# Patient Record
Sex: FEMALE | Race: WHITE | Employment: FULL TIME | URBAN - METROPOLITAN AREA
[De-identification: names, ages, dates, MRNs, and addresses within clinical notes are randomized per-mention and may not be internally consistent; named-entity substitution may affect disease eponyms.]

---

## 2017-02-28 ENCOUNTER — DOCUMENTATION ONLY (OUTPATIENT)
Dept: LAB | Facility: CLINIC | Age: 42
End: 2017-02-28
Payer: COMMERCIAL

## 2017-02-28 DIAGNOSIS — E03.9 HYPOTHYROIDISM: Primary | ICD-10-CM

## 2017-02-28 DIAGNOSIS — L70.0 COMMON ACNE: Primary | ICD-10-CM

## 2017-02-28 LAB
POTASSIUM SERPL-SCNC: 4.3 MMOL/L (ref 3.4–5.3)
T4 FREE SERPL-MCNC: 0.76 NG/DL (ref 0.76–1.46)
TSH SERPL DL<=0.05 MIU/L-ACNC: 1.73 MU/L (ref 0.4–4)

## 2017-02-28 PROCEDURE — 84443 ASSAY THYROID STIM HORMONE: CPT | Performed by: INTERNAL MEDICINE

## 2017-02-28 PROCEDURE — 84439 ASSAY OF FREE THYROXINE: CPT | Performed by: INTERNAL MEDICINE

## 2017-02-28 PROCEDURE — 84132 ASSAY OF SERUM POTASSIUM: CPT | Performed by: DERMATOLOGY

## 2017-02-28 PROCEDURE — 36415 COLL VENOUS BLD VENIPUNCTURE: CPT | Performed by: INTERNAL MEDICINE

## 2017-02-28 NOTE — PROGRESS NOTES
Lab orders placed.     Brandy Chakraborty RN  Endocrine Care Coordinator  [facility]

## 2017-03-01 DIAGNOSIS — E03.9 HYPOTHYROIDISM, UNSPECIFIED: ICD-10-CM

## 2017-03-01 RX ORDER — THYROID,PORK 15 MG
TABLET ORAL
Qty: 270 TABLET | Refills: 3 | OUTPATIENT
Start: 2017-03-01

## 2017-03-01 NOTE — TELEPHONE ENCOUNTER
Refill already completed yesterday for [place] Thyroid.    Brandy Chakraborty, RN  Endocrine Care Coordinator  [facility]

## 2017-03-15 ENCOUNTER — OFFICE VISIT (OUTPATIENT)
Dept: ENDOCRINOLOGY | Facility: CLINIC | Age: 42
End: 2017-03-15
Payer: COMMERCIAL

## 2017-03-15 VITALS
BODY MASS INDEX: 22.28 KG/M2 | HEIGHT: 64 IN | HEART RATE: 65 BPM | SYSTOLIC BLOOD PRESSURE: 118 MMHG | DIASTOLIC BLOOD PRESSURE: 72 MMHG | WEIGHT: 130.51 LBS

## 2017-03-15 DIAGNOSIS — E06.3 HASHIMOTO'S THYROIDITIS: ICD-10-CM

## 2017-03-15 DIAGNOSIS — E55.9 VITAMIN D DEFICIENCY: Primary | ICD-10-CM

## 2017-03-15 PROCEDURE — 99214 OFFICE O/P EST MOD 30 MIN: CPT | Performed by: INTERNAL MEDICINE

## 2017-03-15 NOTE — NURSING NOTE
"Polly Hussein's goals for this visit include: Follow up Hypothyroid  She requests these members of her care team be copied on today's visit information: NO    PCP: None    Referring Provider:  No referring provider defined for this encounter.    Chief Complaint   Patient presents with     Thyroid Problem       Initial Ht 1.613 m (5' 3.5\")  Wt 59.2 kg (130 lb 8.2 oz)  BMI 22.76 kg/m2 Estimated body mass index is 22.76 kg/(m^2) as calculated from the following:    Height as of this encounter: 1.613 m (5' 3.5\").    Weight as of this encounter: 59.2 kg (130 lb 8.2 oz).  Medication Reconciliation: complete    Do you need any medication refills at today's visit? NO    "

## 2017-03-15 NOTE — MR AVS SNAPSHOT
After Visit Summary   3/15/2017    Polly Hussein    MRN: [id number]           Patient Information     Date Of Birth          1975        Visit Information        Provider Department      3/15/2017 4:00 PM Gabby Ayala MD [facility]        Today's Diagnoses     Vitamin D deficiency    -  1    Hashimoto's thyroiditis           Follow-ups after your visit        Your next 10 appointments already scheduled     May 23, 2017  6:30 PM CDT   LAB with LAB FIRST FLOOR [facility] ([facility])    [address] [place] [address] [phone]           Patient must bring picture ID.  Patient should be prepared to give a urine specimen  Please do not eat 10-12 hours before your appointment if you are coming in fasting for labs on lipids, cholesterol, or glucose (sugar).  Pregnant women should follow their Care Team instructions. Water with medications is okay. Do not drink coffee or other fluids.   If you have concerns about taking  your medications, please ask at office or if scheduling via MADS, send a message by clicking on Secure Messaging, Message Your Care Team.              Who to contact     If you have questions or need follow up information about today's clinic visit or your schedule please contact [facility] directly at [phone].  Normal or non-critical lab and imaging results will be communicated to you by Apttushart, letter or phone within 4 business days after the clinic has received the results. If you do not hear from us within 7 days, please contact the clinic through Apttushart or phone. If you have a critical or abnormal lab result, we will notify you by phone as soon as possible.  Submit refill requests through MADS or call your pharmacy and they will forward the refill request to us. Please allow 3 business days for your refill to be completed.          Additional Information  "About Your Visit        [employer] Information     flikdate is an electronic gateway that provides easy, online access to your medical records. With flikdate, you can request a clinic appointment, read your test results, renew a prescription or communicate with your care team.     To sign up for flikdate visit the website at [email or web address]   You will be asked to enter the access code listed below, as well as some personal information. Please follow the directions to create your username and password.     Your access code is: [email or web address]  Expires: 2017 11:09 PM     Your access code will  in 90 days. If you need help or a new code, please contact your [facility] or call [phone] for assistance.        Care EveryWhere ID     This is your Care EveryWhere ID. This could be used by other organizations to access your [facility] medical records  [id number]        Your Vitals Were     Pulse Height BMI (Body Mass Index)             65 1.613 m (5' 3.5\") 22.76 kg/m2          Blood Pressure from Last 3 Encounters:   03/15/17 118/72   16 108/67   14 118/64    Weight from Last 3 Encounters:   03/15/17 59.2 kg (130 lb 8.2 oz)   16 60.1 kg (132 lb 8 oz)   14 62.4 kg (137 lb 9 oz)               Primary Care Provider    None       No address on file        Thank you!     Thank you for choosing [facility]  for your care. Our goal is always to provide you with excellent care. Hearing back from our patients is one way we can continue to improve our services. Please take a few minutes to complete the written survey that you may receive in the mail after your visit with us. Thank you!             Your Updated Medication List - Protect others around you: Learn how to safely use, store and throw away your medicines at www.disposemymeds.org.          This list is accurate as of: 3/15/17 11:59 PM.  Always use your most recent med list.             "       Brand Name Dispense Instructions for use    DOMITILA THYROID 15 MG Tabs tablet   Generic drug:  thyroid     270 tablet    TAKE THREE TABLETS BY MOUTH DAILY       cholecalciferol 2000 UNITS tablet     90 tablet    Take 1 tablet by mouth daily       CLARITIN 10 MG tablet   Generic drug:  loratadine      Take 10 mg by mouth daily       clobetasol 0.05 % cream    TEMOVATE     Apply topically 2 times daily       NASONEX 50 MCG/ACT spray   Generic drug:  mometasone      Spray 2 sprays into both nostrils daily       SPIRONOLACTONE PO      Take 25 mg by mouth 2 times daily       triamcinolone 0.1 % cream    KENALOG     Apply topically 2 times daily

## 2017-03-15 NOTE — PROGRESS NOTES
The patient is seen in f/up for evaluation of hypothyroidism.  She was initially seen in our clinic in September 2014.    Polly Hussein is a 42 year old female diagnosed with hypothyroidism after the delivery of her 3rd child, almost 8 years ago. She was initially treated with levothyroxine and Cytomel until 2011, when she established care with doctor Sameer and Cytomel was discontinued.  Average dose of levothyroxine over the last few years has been around 100  g daily. Initially after diagnosis, she saw doctor Preethi.     In July 2014, she was seen by her primary care provider and she was transitioned to [place] [facility] due to persistent fatigue. She continues to take 45 mg daily.     Today, she complains of persistent fatigue.  According to the patient, the fatigue has been present since her 1st delivery, a few years prior to being formally diagnosed with hypothyroidism. Her weight has been fairly stable since last year. The patient reports being compliant with a gluten free diet and a regular exercise schedule. Intermittently, she develops erythematosus and itchy lesions on her forearms.    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   Depression - stopped tx in 2013  Acne  Allergic rhinitis  Dry eyes     Past Surgical History   Procedure Laterality Date     Ent surgery  1997     Tonsillectomy     Gyn surgery  2011     Partial Hysterectomy - prolapsed uterus and bladder sling - ovaries were left in place      Current Medications    Current Outpatient Prescriptions:      ARMOUR THYROID 15 MG TABS tablet, TAKE THREE TABLETS BY MOUTH DAILY, Disp: 270 tablet, Rfl: 3     SPIRONOLACTONE PO, Take 25 mg by mouth 2 times daily, Disp: , Rfl:      cholecalciferol 2000 UNITS tablet, Take 1 tablet by  "mouth daily, Disp: 90 tablet, Rfl: 3     loratadine (CLARITIN) 10 MG tablet, Take 10 mg by mouth daily, Disp: , Rfl:      mometasone (NASONEX) 50 MCG/ACT nasal spray, Spray 2 sprays into both nostrils daily, Disp: , Rfl:      clobetasol (TEMOVATE) 0.05 % cream, Apply topically 2 times daily, Disp: , Rfl:      triamcinolone (KENALOG) 0.1 % cream, Apply topically 2 times daily, Disp: , Rfl:     Family History   Problem Relation Age of Onset     Heart  Father      Breast CA Maternal Grandmother      Diabetes  Paternal Grandmother      Colon CA, osteoporosis  Paternal Grandmother    Maternal grandmother - hypothyroidism.     Social History   with 3 children. She denies smoking, occasional drinks alcohol. Denies using illicit drugs. Occupation: .     Review of Systems   Systemic:              As above   Eye:                      R eye twitching for the last year    Go-Laryngeal:     No go-laryngeal symptoms, no dysphagia, no hoarseness, no cough     Breast:                  No breast symptoms  Cardiovascular:    No cardiovascular symptoms, no CP or palpitations   Pulmonary:           No pulmonary symptoms, no SOB or cough    Gastrointestinal:   Alternating episodes of diarrhea and constipation   Genitourinary:       No genitourinary symptoms, no increased thirst or urination   Endocrine:            cold intolerance which got worse over the years; denies hot flashes    Neurological:        No neurological symptoms, no headaches, no tremor, no numbness or tingling sensation, no dizziness   Musculoskeletal:  No musculoskeletal symptoms, no muscle or joint pain   Skin:                     Dry skin  Psychological:     No psychological symptoms                 Vital Signs     Previous Weights:    Wt Readings from Last 10 Encounters:   03/15/17 59.2 kg (130 lb 8.2 oz)   03/08/16 60.1 kg (132 lb 8 oz)   09/23/14 62.4 kg (137 lb 9 oz)        /72  Pulse 65  Ht 1.613 m (5' 3.5\")  Wt 59.2 kg " (130 lb 8.2 oz)  BMI 22.76 kg/m2    Physical Exam  General Appearance: she is well developed, well nourished; anxiously looking   Eyes:  conjutivae and extra-ocular motions are normal.                                    pupils round and reactive to light, no lid lag, no stare    HEENT:   oropharynx clear and moist, no JVD, no bruits      no thyromegaly, no palpable nodules   Cardiovascular:  regular rhythm, no murmurs, distal pulse palpable, no edema  Respiratory:        chest clear, no rales, no rhonchi   Musculoskeletal:  normal tone and strength  Psychological:          affect and judgment normal  Skin:  small, round, erythematous lesion on the left forearm   Neurological:  reflexes normal and symmetric, no resting tremor.     Lab Results  I reviewed prior lab results documented in careeverywhere.  TSH   Date Value Ref Range Status   02/28/2017 1.73 0.40 - 4.00 mU/L Final     T4 Free   Date Value Ref Range Status   02/28/2017 0.76 0.76 - 1.46 ng/dL Final     Assessment     Hashimoto thyroiditis    Clinically, with the exception of the long-standing fatigue and cold intolerance, the patient doesn't endorse any other symptoms suggestive of hypothyroidism.  Biochemically, she is euthyroid, so I recommended to continue to take the same dose of [place] Thyroid.    She was very irritated by the fact that I didn't order a T3.  I explained to her the physiopathology of the thyroid hormones and the fact that I do not need to check T3, since TSH is the most accurate indicator of adequate replacement with thyroid hormones. She requested to have a vitamin D level checked. Since she hasn't been taking vitamin D supplements on a regular basis, I recommended to start taking 1000 units vitamin D daily and have a vitamin D level checked in 2 months.  We're going to decide at that time whether or not she takes an adequate amount of vitamin D.    Orders Placed This Encounter   Procedures     25 Hydroxyvitamin D2 and D3

## 2017-03-19 PROBLEM — E55.9 VITAMIN D DEFICIENCY: Status: ACTIVE | Noted: 2017-03-19

## 2017-03-19 PROBLEM — E06.3 HASHIMOTO'S THYROIDITIS: Status: ACTIVE | Noted: 2017-03-19

## 2017-05-23 DIAGNOSIS — E55.9 VITAMIN D DEFICIENCY: ICD-10-CM

## 2017-05-23 PROCEDURE — 36415 COLL VENOUS BLD VENIPUNCTURE: CPT | Performed by: INTERNAL MEDICINE

## 2017-05-23 PROCEDURE — 82306 VITAMIN D 25 HYDROXY: CPT | Performed by: INTERNAL MEDICINE

## 2017-05-25 LAB
DEPRECATED CALCIDIOL+CALCIFEROL SERPL-MC: NORMAL UG/L (ref 20–75)
VITAMIN D2 SERPL-MCNC: <5 UG/L
VITAMIN D3 SERPL-MCNC: 48 UG/L

## 2017-12-01 ENCOUNTER — TELEPHONE (OUTPATIENT)
Dept: ENDOCRINOLOGY | Facility: CLINIC | Age: 42
End: 2017-12-01

## 2017-12-01 NOTE — TELEPHONE ENCOUNTER
[facility] Call Center    Phone Message    Name of Caller: Polly    Phone Number: Cell number on file:    Telephone Information:   Mobile [phone]       Best time to return call: any    May a detailed message be left on voicemail: yes    Relation to patient: Self    Reason for Call: Other: Patient is calling requesting to speak with clinic regarding a change with her insurance where her insurnace will no longer cover her medications. Patient states she would like to discuss with staff if she should see Dr. Ayala in December or kind of what her options will be with this new insurance change. Please advise.      Action Taken: Message routed to:  Adult Clinics: Endocrinology p [phone]

## 2017-12-01 NOTE — TELEPHONE ENCOUNTER
"Patient reports that \"[facility] states that medication is no longer on formulary\", [place] [facility] is covered through January 1st. Patient would like to request labs and a refill on [place] [facility] to carry her until she is able to review options with Dr Ayala at follow up appointment in March. No labs are in place, writer will route to Dr Ayaal for review.  Monalisa Castañeda LPN    "

## 2017-12-03 DIAGNOSIS — E06.3 HASHIMOTO'S THYROIDITIS: Primary | ICD-10-CM

## 2017-12-03 RX ORDER — THYROID 15 MG/1
45 TABLET ORAL DAILY
Qty: 270 TABLET | Refills: 1 | Status: SHIPPED | OUTPATIENT
Start: 2017-12-03 | End: 2018-03-20

## 2017-12-03 NOTE — PROGRESS NOTES
She doesn't require las until March, when she sees me in the clinic. I refilled her prescription. In the event she cannot afford to pay for it after January, can she check her formulary and let us know what else is covered?

## 2017-12-06 NOTE — TELEPHONE ENCOUNTER
Per Dr. Ayala:    She doesn't require las until March, when she sees me in the clinic. I refilled her prescription. In the event she cannot afford to pay for it after January, can she check her formulary and let us know what else is covered?         Attempted to contact patient. Left voicemail for patient to contact our office.     Brandy Chakraborty RN  Endocrine Care Coordinator  [facility]

## 2018-03-16 ENCOUNTER — DOCUMENTATION ONLY (OUTPATIENT)
Dept: LAB | Facility: CLINIC | Age: 43
End: 2018-03-16

## 2018-03-16 NOTE — PROGRESS NOTES
Please refer to 12/1/17 encounter for details.    Lab appt canceled. Labs will be completed at 3/20/18 office visit.    Detailed message left on patients cell. Encouraged to return clinic call with any questions or concerns.    Lucero Serrano LPN  Adult Endocrinology  [facility]

## 2018-03-20 ENCOUNTER — OFFICE VISIT (OUTPATIENT)
Dept: ENDOCRINOLOGY | Facility: CLINIC | Age: 43
End: 2018-03-20
Payer: COMMERCIAL

## 2018-03-20 VITALS
WEIGHT: 142.42 LBS | DIASTOLIC BLOOD PRESSURE: 72 MMHG | HEART RATE: 68 BPM | BODY MASS INDEX: 24.31 KG/M2 | SYSTOLIC BLOOD PRESSURE: 109 MMHG | OXYGEN SATURATION: 97 % | HEIGHT: 64 IN

## 2018-03-20 DIAGNOSIS — L70.9 ACNE, UNSPECIFIED ACNE TYPE: Primary | ICD-10-CM

## 2018-03-20 DIAGNOSIS — E06.3 HASHIMOTO'S THYROIDITIS: ICD-10-CM

## 2018-03-20 DIAGNOSIS — R21 RASH AND NONSPECIFIC SKIN ERUPTION: Primary | ICD-10-CM

## 2018-03-20 DIAGNOSIS — L70.9 ACNE, UNSPECIFIED ACNE TYPE: ICD-10-CM

## 2018-03-20 LAB
POTASSIUM SERPL-SCNC: 4.1 MMOL/L (ref 3.4–5.3)
T4 FREE SERPL-MCNC: 0.78 NG/DL (ref 0.76–1.46)
TSH SERPL DL<=0.005 MIU/L-ACNC: 2.16 MU/L (ref 0.4–4)

## 2018-03-20 PROCEDURE — 84132 ASSAY OF SERUM POTASSIUM: CPT | Performed by: FAMILY MEDICINE

## 2018-03-20 PROCEDURE — 99213 OFFICE O/P EST LOW 20 MIN: CPT | Performed by: INTERNAL MEDICINE

## 2018-03-20 PROCEDURE — 36415 COLL VENOUS BLD VENIPUNCTURE: CPT | Performed by: INTERNAL MEDICINE

## 2018-03-20 PROCEDURE — 84439 ASSAY OF FREE THYROXINE: CPT | Performed by: INTERNAL MEDICINE

## 2018-03-20 PROCEDURE — 84443 ASSAY THYROID STIM HORMONE: CPT | Performed by: INTERNAL MEDICINE

## 2018-03-20 RX ORDER — THYROID 15 MG/1
45 TABLET ORAL DAILY
Qty: 270 TABLET | Refills: 3 | Status: SHIPPED | OUTPATIENT
Start: 2018-03-20 | End: 2019-03-19

## 2018-03-20 RX ORDER — CLOBETASOL PROPIONATE 0.5 MG/G
CREAM TOPICAL 2 TIMES DAILY
Qty: 30 G | Refills: 0 | Status: SHIPPED | OUTPATIENT
Start: 2018-03-20 | End: 2018-08-22

## 2018-03-20 NOTE — NURSING NOTE
"Polly Hussein's goals for this visit include: Follow up Hashimotos  She requests these members of her care team be copied on today's visit information: NO    PCP: [facility]    Referring Provider:  No referring provider defined for this encounter.    Chief Complaint   Patient presents with     Thyroid Problem       Initial Ht 1.613 m (5' 3.5\")  Wt 64.6 kg (142 lb 6.7 oz)  BMI 24.83 kg/m2 Estimated body mass index is 24.83 kg/(m^2) as calculated from the following:    Height as of this encounter: 1.613 m (5' 3.5\").    Weight as of this encounter: 64.6 kg (142 lb 6.7 oz).  Medication Reconciliation: complete    Do you need any medication refills at today's visit? YES    "

## 2018-03-20 NOTE — LETTER
"    3/20/2018         RE: Polly Hussein  [address] [place] [facility] [id number]        Dear Colleague,    Thank you for referring your patient, Polly Hussein, to the [facility]. Please see a copy of my visit note below.      The patient is seen in f/up for evaluation of hypothyroidism.     Polly Hussein is a 43 year old female with a PMH significant for celiac disease, eczema, depression, diagnosed with hypothyroidism after the delivery of her 3rd child, over 8 years ago. She was initially treated with levothyroxine and Cytomel until 2011, when she established care with doctor Sameer and Cytomel was discontinued.  Average dose of levothyroxine had been around 100  g daily. Initially after diagnosis, she saw doctor Oro.  In July 2014, she was seen by her primary care provider and she was transitioned to [facility] due to persistent fatigue. She continues to take 45 mg daily.     Polly has a long-standing history of fatigue, present since her first delivery, a few years prior to being formally diagnosed with hypothyroidism.  She reports feeling better, with better energy levels since taking her herbal Chinese supplement called \"Candida\".  She has been taking it for the last 3 months.  She endorses a recurrent rash which occurs mainly on the abdomen and back, itchy.  She was prescribed clobetasol, which provides relief.    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   Depression - stopped tx in 2013  Acne, treated with spironolactone  Allergic rhinitis  Dry eyes     Past Surgical History   Procedure Laterality Date     Ent surgery  1997     Tonsillectomy     Gyn surgery  2011     Partial Hysterectomy - prolapsed uterus and bladder sling - ovaries were " left in place      Current Medications    Current Outpatient Prescriptions:      thyroid (ARMOUR THYROID) 15 MG TABS tablet, Take 3 tablets (45 mg) by mouth daily, Disp: 270 tablet, Rfl: 1     SPIRONOLACTONE PO, Take 25 mg by mouth 2 times daily, Disp: , Rfl:      cholecalciferol 2000 UNITS tablet, Take 1 tablet by mouth daily, Disp: 90 tablet, Rfl: 3     loratadine (CLARITIN) 10 MG tablet, Take 10 mg by mouth daily, Disp: , Rfl:      mometasone (NASONEX) 50 MCG/ACT nasal spray, Spray 2 sprays into both nostrils daily, Disp: , Rfl:      clobetasol (TEMOVATE) 0.05 % cream, Apply topically 2 times daily, Disp: , Rfl:      triamcinolone (KENALOG) 0.1 % cream, Apply topically 2 times daily, Disp: , Rfl:     Family History   Problem Relation Age of Onset     Heart  Father      Breast CA Maternal Grandmother      Diabetes  Paternal Grandmother      Colon CA, osteoporosis  Paternal Grandmother    Maternal grandmother - hypothyroidism.     Social History   with 3 children. She denies smoking, occasional drinks alcohol. Denies using illicit drugs. Occupation: .     Review of Systems   Systemic:              Weight up 12 lbs in the last year    Eye:                      R eye twitching for the last year    Go-Laryngeal:     No go-laryngeal symptoms, no dysphagia, no hoarseness, no cough     Breast:                  No breast symptoms  Cardiovascular:    No cardiovascular symptoms, no CP or palpitations   Pulmonary:           No pulmonary symptoms, no SOB or cough    Gastrointestinal:   Alternating episodes of diarrhea and constipation   Genitourinary:       No genitourinary symptoms, no increased thirst or urination   Endocrine:            cold intolerance which got worse over the years; denies hot flashes    Neurological:        No neurological symptoms, no headaches, no tremor, no numbness or tingling sensation, no dizziness   Musculoskeletal:  No musculoskeletal symptoms, no muscle or  "joint pain   Skin:                    Hair line is receeding in the last year   Psychological:     No psychological symptoms                 Vital Signs     Previous Weights:    Wt Readings from Last 10 Encounters:   03/20/18 64.6 kg (142 lb 6.7 oz)   03/15/17 59.2 kg (130 lb 8.2 oz)   03/08/16 60.1 kg (132 lb 8 oz)   09/23/14 62.4 kg (137 lb 9 oz)        /72  Pulse 68  Ht 1.613 m (5' 3.5\")  Wt 64.6 kg (142 lb 6.7 oz)  SpO2 97%  BMI 24.83 kg/m2    Physical Exam  General Appearance: she is well developed, well nourished; anxiously looking   Eyes:  conjutivae and extra-ocular motions are normal.                                    pupils round and reactive to light, no lid lag, no stare    HEENT:   oropharynx clear and moist, no JVD, no bruits      no thyromegaly, no palpable nodules   Cardiovascular:  regular rhythm, no murmurs, distal pulse palpable, no edema  Respiratory:        chest clear, no rales, no rhonchi   Musculoskeletal:  normal tone and strength  Psychological:          affect and judgment normal  Skin: diffuse rash present on the abdomen and back  Neurological:  reflexes normal and symmetric, no resting tremor.     Lab Results  [facility]  TSH   Date Value Ref Range Status   02/28/2017 1.73 0.40 - 4.00 mU/L Final     T4 Free   Date Value Ref Range Status   02/28/2017 0.76 0.76 - 1.46 ng/dL Final     Assessment     Hashimoto thyroiditis  Clinically, the patient appears to be euthyroid.  Counseled on the fact that sometimes, herbal supplements can interfere with thyroid gland function or thyroid hormone levels.  Unfortunately, she does not know exactly what they contain. We are going to check the thyroid hormone levels today.     Skin rash  Refilled the prescription for clobetasol and I advised the patient to consult an allergy specialist.    Orders Placed This Encounter   Procedures     TSH     T4 free                      Again, thank you for " allowing me to participate in the care of your patient.        Sincerely,        Gabby Ayala MD

## 2018-03-20 NOTE — PROGRESS NOTES
"  The patient is seen in f/up for evaluation of hypothyroidism.     Polly Hussein is a 43 year old female with a PMH significant for celiac disease, eczema, depression, diagnosed with hypothyroidism after the delivery of her 3rd child, over 8 years ago. She was initially treated with levothyroxine and Cytomel until 2011, when she established care with doctor Sameer and Cytomel was discontinued.  Average dose of levothyroxine had been around 100  g daily. Initially after diagnosis, she saw doctor Preethi.  In July 2014, she was seen by her primary care provider and she was transitioned to [place] Thyroid due to persistent fatigue. She continues to take 45 mg daily.     Polly has a long-standing history of fatigue, present since her first delivery, a few years prior to being formally diagnosed with hypothyroidism.  She reports feeling better, with better energy levels since taking her herbal Chinese supplement called \"Candida\".  She has been taking it for the last 3 months.  She endorses a recurrent rash which occurs mainly on the abdomen and back, itchy.  She was prescribed clobetasol, which provides relief.    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   Depression - stopped tx in 2013  Acne, treated with spironolactone  Allergic rhinitis  Dry eyes     Past Surgical History   Procedure Laterality Date     Ent surgery  1997     Tonsillectomy     Gyn surgery  2011     Partial Hysterectomy - prolapsed uterus and bladder sling - ovaries were left in place      Current Medications    Current Outpatient Prescriptions:      thyroid (ARMOUR THYROID) 15 MG TABS tablet, Take 3 tablets (45 mg) by mouth daily, Disp: 270 tablet, Rfl: 1     SPIRONOLACTONE PO, Take 25 mg by mouth 2 times daily, Disp: , " Rfl:      cholecalciferol 2000 UNITS tablet, Take 1 tablet by mouth daily, Disp: 90 tablet, Rfl: 3     loratadine (CLARITIN) 10 MG tablet, Take 10 mg by mouth daily, Disp: , Rfl:      mometasone (NASONEX) 50 MCG/ACT nasal spray, Spray 2 sprays into both nostrils daily, Disp: , Rfl:      clobetasol (TEMOVATE) 0.05 % cream, Apply topically 2 times daily, Disp: , Rfl:      triamcinolone (KENALOG) 0.1 % cream, Apply topically 2 times daily, Disp: , Rfl:     Family History   Problem Relation Age of Onset     Heart  Father      Breast CA Maternal Grandmother      Diabetes  Paternal Grandmother      Colon CA, osteoporosis  Paternal Grandmother    Maternal grandmother - hypothyroidism.     Social History   with 3 children. She denies smoking, occasional drinks alcohol. Denies using illicit drugs. Occupation: .     Review of Systems   Systemic:              Weight up 12 lbs in the last year    Eye:                      R eye twitching for the last year    Go-Laryngeal:     No go-laryngeal symptoms, no dysphagia, no hoarseness, no cough     Breast:                  No breast symptoms  Cardiovascular:    No cardiovascular symptoms, no CP or palpitations   Pulmonary:           No pulmonary symptoms, no SOB or cough    Gastrointestinal:   Alternating episodes of diarrhea and constipation   Genitourinary:       No genitourinary symptoms, no increased thirst or urination   Endocrine:            cold intolerance which got worse over the years; denies hot flashes    Neurological:        No neurological symptoms, no headaches, no tremor, no numbness or tingling sensation, no dizziness   Musculoskeletal:  No musculoskeletal symptoms, no muscle or joint pain   Skin:                    Hair line is receeding in the last year   Psychological:     No psychological symptoms                 Vital Signs     Previous Weights:    Wt Readings from Last 10 Encounters:   03/20/18 64.6 kg (142 lb 6.7 oz)  "  03/15/17 59.2 kg (130 lb 8.2 oz)   03/08/16 60.1 kg (132 lb 8 oz)   09/23/14 62.4 kg (137 lb 9 oz)        /72  Pulse 68  Ht 1.613 m (5' 3.5\")  Wt 64.6 kg (142 lb 6.7 oz)  SpO2 97%  BMI 24.83 kg/m2    Physical Exam  General Appearance: she is well developed, well nourished; anxiously looking   Eyes:  conjutivae and extra-ocular motions are normal.                                    pupils round and reactive to light, no lid lag, no stare    HEENT:   oropharynx clear and moist, no JVD, no bruits      no thyromegaly, no palpable nodules   Cardiovascular:  regular rhythm, no murmurs, distal pulse palpable, no edema  Respiratory:        chest clear, no rales, no rhonchi   Musculoskeletal:  normal tone and strength  Psychological:          affect and judgment normal  Skin: diffuse rash present on the abdomen and back  Neurological:  reflexes normal and symmetric, no resting tremor.     Lab Results  I reviewed prior lab results documented in [facility].  TSH   Date Value Ref Range Status   02/28/2017 1.73 0.40 - 4.00 mU/L Final     T4 Free   Date Value Ref Range Status   02/28/2017 0.76 0.76 - 1.46 ng/dL Final     Assessment     Hashimoto thyroiditis  Clinically, the patient appears to be euthyroid.  Counseled on the fact that sometimes, herbal supplements can interfere with thyroid gland function or thyroid hormone levels.  Unfortunately, she does not know exactly what they contain. We are going to check the thyroid hormone levels today.     Skin rash  Refilled the prescription for clobetasol and I advised the patient to consult an allergy specialist.    Orders Placed This Encounter   Procedures     TSH     T4 free                    "

## 2018-03-20 NOTE — MR AVS SNAPSHOT
After Visit Summary   3/20/2018    Polly Hussein    MRN: [id number]           Patient Information     Date Of Birth          1975        Visit Information        Provider Department      3/20/2018 4:00 PM Gabby Ayala MD [facility]        Today's Diagnoses     Hashimoto's thyroiditis          Care Instructions    Please allow 7-10 business days for your lab results. You will be notified by phone, letter, or My Chart after the provider has reviewed them.  Thank you.             Follow-ups after your visit        Your next 10 appointments already scheduled     Mar 19, 2019  4:00 PM CDT   Return Visit with Gabby Ayala MD   [facility] ([facility])    [address] [place] [id number] [phone]              Future tests that were ordered for you today     Open Future Orders        Priority Expected Expires Ordered    TSH Routine 3/20/2018 3/20/2019 3/20/2018    T4 free Routine 3/20/2018 3/20/2019 3/20/2018            Who to contact     If you have questions or need follow up information about today's clinic visit or your schedule please contact [facility] directly at [phone].  Normal or non-critical lab and imaging results will be communicated to you by MyChart, letter or phone within 4 business days after the clinic has received the results. If you do not hear from us within 7 days, please contact the clinic through MyChart or phone. If you have a critical or abnormal lab result, we will notify you by phone as soon as possible.  Submit refill requests through [employer] or call your pharmacy and they will forward the refill request to us. Please allow 3 business days for your refill to be completed.          Additional Information About Your Visit        [employer] Information     [employer] is an electronic gateway that provides easy, online access to your medical records. With [employer],  "you can request a clinic appointment, read your test results, renew a prescription or communicate with your care team.     To sign up for [facility] visit the website at [email or web address]   You will be asked to enter the access code listed below, as well as some personal information. Please follow the directions to create your username and password.     Your access code is: [email or web address]  Expires: 2018  4:27 PM     Your access code will  in 90 days. If you need help or a new code, please contact your [facility] or call [phone] for assistance.        Care EveryWhere ID     This is your Care EveryWhere ID. This could be used by other organizations to access your [place] medical records  [id number]        Your Vitals Were     Pulse Height Pulse Oximetry BMI (Body Mass Index)          68 1.613 m (5' 3.5\") 97% 24.83 kg/m2         Blood Pressure from Last 3 Encounters:   18 109/72   03/15/17 118/72   16 108/67    Weight from Last 3 Encounters:   18 64.6 kg (142 lb 6.7 oz)   03/15/17 59.2 kg (130 lb 8.2 oz)   16 60.1 kg (132 lb 8 oz)                 Where to get your medicines      These medications were sent to [facility] [address] [facility] - [place], [place] - [address] [facility]  [address] [facility] [address]     Phone:  [phone]     clobetasol 0.05 % cream    thyroid 15 MG Tabs tablet          Primary Care Provider Office Phone # Fax #    [facility] [phone]       [address]  [facility] [address]        Equal Access to Services     CYNTHIA LUCAS : Hadii dagoberto Davila, waarsalanda kwasi, qaybta kaalmajonathan lazaro. So [facility] [phone].    ATENCIÓN: Si habla español, tiene a clay disposición servicios gratuitos de asistencia lingüística. Llame al [phone].    We comply with applicable federal civil rights laws and [place] laws. We do " not discriminate on the basis of race, color, national origin, age, disability, sex, sexual orientation, or gender identity.            Thank you!     Thank you for choosing [facility]  for your care. Our goal is always to provide you with excellent care. Hearing back from our patients is one way we can continue to improve our services. Please take a few minutes to complete the written survey that you may receive in the mail after your visit with us. Thank you!             Your Updated Medication List - Protect others around you: Learn how to safely use, store and throw away your medicines at www.disposemymeds.org.          This list is accurate as of 3/20/18  4:27 PM.  Always use your most recent med list.                   Brand Name Dispense Instructions for use Diagnosis    CLARITIN 10 MG tablet   Generic drug:  loratadine      Take 10 mg by mouth daily        clobetasol 0.05 % cream    TEMOVATE    30 g    Apply topically 2 times daily    Hashimoto's thyroiditis       NASONEX 50 MCG/ACT spray   Generic drug:  mometasone      Spray 2 sprays into both nostrils daily        SPIRONOLACTONE PO      Take 25 mg by mouth 2 times daily        thyroid 15 MG Tabs tablet    ARMOUR THYROID    270 tablet    Take 3 tablets (45 mg) by mouth daily    Hashimoto's thyroiditis       triamcinolone 0.1 % cream    KENALOG     Apply topically 2 times daily

## 2018-03-21 ENCOUNTER — TELEPHONE (OUTPATIENT)
Dept: ENDOCRINOLOGY | Facility: CLINIC | Age: 43
End: 2018-03-21

## 2018-03-21 NOTE — TELEPHONE ENCOUNTER
Received result note from Dr. Ayala as follows:    The thyroid hormone levels are normal.      Patient advised. Patient verbalizes understanding.      Brandy Chakraborty RN  Endocrine Care Coordinator  [facility]

## 2019-03-18 ENCOUNTER — TELEPHONE (OUTPATIENT)
Dept: ENDOCRINOLOGY | Facility: CLINIC | Age: 44
End: 2019-03-18

## 2019-03-18 NOTE — TELEPHONE ENCOUNTER
Left message for pt to call back to clinic. Pt can do labs at the time of the visit.  Tracey Peguero, CMA

## 2019-03-18 NOTE — TELEPHONE ENCOUNTER
[facility] Call Center    Phone Message    May a detailed message be left on voicemail: yes    Reason for Call: Other: Pt has an appointment tomorrow with Dr. Ayala and states she thinks she needs labs prior to this appt. Please call pt back to discuss .     Action Taken: Message routed to:  Adult Clinics: Endocrinology p [phone]

## 2019-03-19 ENCOUNTER — OFFICE VISIT (OUTPATIENT)
Dept: ENDOCRINOLOGY | Facility: CLINIC | Age: 44
End: 2019-03-19
Payer: COMMERCIAL

## 2019-03-19 VITALS
HEIGHT: 63 IN | BODY MASS INDEX: 25.52 KG/M2 | HEART RATE: 71 BPM | OXYGEN SATURATION: 96 % | DIASTOLIC BLOOD PRESSURE: 76 MMHG | WEIGHT: 144 LBS | SYSTOLIC BLOOD PRESSURE: 115 MMHG

## 2019-03-19 DIAGNOSIS — E06.3 HASHIMOTO'S THYROIDITIS: ICD-10-CM

## 2019-03-19 DIAGNOSIS — L65.9 HAIR LOSS: Primary | ICD-10-CM

## 2019-03-19 DIAGNOSIS — R79.89 LOW TESTOSTERONE: ICD-10-CM

## 2019-03-19 LAB
FERRITIN SERPL-MCNC: 70 NG/ML (ref 12–150)
T4 FREE SERPL-MCNC: 0.67 NG/DL (ref 0.76–1.46)
TSH SERPL DL<=0.005 MIU/L-ACNC: 2.5 MU/L (ref 0.4–4)

## 2019-03-19 PROCEDURE — 82728 ASSAY OF FERRITIN: CPT | Performed by: INTERNAL MEDICINE

## 2019-03-19 PROCEDURE — 84443 ASSAY THYROID STIM HORMONE: CPT | Performed by: INTERNAL MEDICINE

## 2019-03-19 PROCEDURE — 82627 DEHYDROEPIANDROSTERONE: CPT | Performed by: INTERNAL MEDICINE

## 2019-03-19 PROCEDURE — 84403 ASSAY OF TOTAL TESTOSTERONE: CPT | Performed by: INTERNAL MEDICINE

## 2019-03-19 PROCEDURE — 84439 ASSAY OF FREE THYROXINE: CPT | Performed by: INTERNAL MEDICINE

## 2019-03-19 PROCEDURE — 99213 OFFICE O/P EST LOW 20 MIN: CPT | Performed by: INTERNAL MEDICINE

## 2019-03-19 PROCEDURE — 84270 ASSAY OF SEX HORMONE GLOBUL: CPT | Performed by: INTERNAL MEDICINE

## 2019-03-19 PROCEDURE — 36415 COLL VENOUS BLD VENIPUNCTURE: CPT | Performed by: INTERNAL MEDICINE

## 2019-03-19 RX ORDER — THYROID 15 MG/1
45 TABLET ORAL DAILY
Qty: 270 TABLET | Refills: 3 | Status: SHIPPED | OUTPATIENT
Start: 2019-03-19 | End: 2019-07-18

## 2019-03-19 ASSESSMENT — MIFFLIN-ST. JEOR: SCORE: 1278.43

## 2019-03-19 NOTE — NURSING NOTE
"Polly Hussein's goals for this visit include: Thyroid follow up  She requests these members of her care team be copied on today's visit information: Yes    PCP: [facility]    Referring Provider:  No referring provider defined for this encounter.    /76 (BP Location: Left arm, Patient Position: Chair, Cuff Size: Adult Regular)   Pulse 71   Ht 1.61 m (5' 3.39\")   Wt 65.3 kg (144 lb)   SpO2 96%   BMI 25.20 kg/m      Do you need any medication refills at today's visit? Yes, ted thyroid  "

## 2019-03-19 NOTE — LETTER
3/19/2019         RE: Polly Hussein  [address] [facility] [address]        Dear Colleague,    Thank you for referring your patient, Polly Hussein, to the [facility]. Please see a copy of my visit note below.      The patient is seen in f/up for evaluation of hypothyroidism.     Polly Hussein is a 44 year old female with a PMH significant for celiac disease, eczema, depression, diagnosed with hypothyroidism after the delivery of her 3rd child, over 8 years ago. She was initially treated with levothyroxine and Cytomel until 2011, when she established care with doctor Sameer and Cytomel was discontinued.  Average dose of levothyroxine had been around 100  g daily. Initially after diagnosis, she saw doctor Preethi.  In July 2014, she was seen by her primary care provider and she was transitioned to [place] Thyroid due to persistent fatigue. She continues to take 45 mg daily.     Polly has a long-standing history of fatigue, present since her first delivery, a few years prior to being formally diagnosed with hypothyroidism.  Besides feeling tired, today she also endorses a weight gain of 10 pounds over the last year, inability to lose weight, worsening hair loss and thinning of the eyebrows.    The patient was very irritated today by the fact that lab work was not done prior to the appointment.     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   Depression - stopped tx in 2013  Acne, treated with spironolactone  Allergic rhinitis  Dry eyes     Past Surgical History   Procedure Laterality Date     Ent surgery  1997     Tonsillectomy     Gyn surgery  2011     Partial Hysterectomy - prolapsed uterus and bladder sling - ovaries were left in place       Current Medications    Current Outpatient Medications:      clobetasol (TEMOVATE) 0.05 % cream, APPLY TO AFFECTED AREAS 2 TIMES DAILY, Disp: 30 g, Rfl: 0     loratadine (CLARITIN) 10 MG tablet, Take 10 mg by mouth daily, Disp: , Rfl:      mometasone (NASONEX) 50 MCG/ACT nasal spray, Spray 2 sprays into both nostrils daily, Disp: , Rfl:      SPIRONOLACTONE PO, Take 25 mg by mouth 2 times daily, Disp: , Rfl:      thyroid (ARMOUR THYROID) 15 MG TABS tablet, Take 3 tablets (45 mg) by mouth daily, Disp: 270 tablet, Rfl: 3     triamcinolone (KENALOG) 0.1 % cream, Apply topically 2 times daily, Disp: , Rfl:     Family History   Problem Relation Age of Onset     Heart  Father      Breast CA Maternal Grandmother      Diabetes  Paternal Grandmother      Colon CA, osteoporosis  Paternal Grandmother    Maternal grandmother - hypothyroidism.     Social History   with 3 children. She denies smoking, occasional drinks alcohol. Denies using illicit drugs. Occupation: .     Review of Systems   Systemic:             As above  Eye:                      R eye twitching for the last year    Go-Laryngeal:     No go-laryngeal symptoms, no dysphagia, no hoarseness, no cough     Breast:                  No breast symptoms  Cardiovascular:    No cardiovascular symptoms, no CP or palpitations   Pulmonary:           No pulmonary symptoms, no SOB or cough    Gastrointestinal:   Alternating episodes of diarrhea and constipation   Genitourinary:       No genitourinary symptoms, no increased thirst or urination   Endocrine:            cold intolerance which got worse over the years; denies hot flashes    Neurological:        No neurological symptoms, no headaches, no tremor, no numbness or tingling sensation, no dizziness   Musculoskeletal:  No musculoskeletal symptoms, no muscle or joint pain   Skin:                    Hair line is receeding for years; eyebrows are thinner and the hair loss has been  "worsening, recently   Psychological:     No psychological symptoms                 Vital Signs     Previous Weights:    Wt Readings from Last 10 Encounters:   03/19/19 65.3 kg (144 lb)   03/20/18 64.6 kg (142 lb 6.7 oz)   03/15/17 59.2 kg (130 lb 8.2 oz)   03/08/16 60.1 kg (132 lb 8 oz)   09/23/14 62.4 kg (137 lb 9 oz)        /76 (BP Location: Left arm, Patient Position: Chair, Cuff Size: Adult Regular)   Pulse 71   Ht 1.61 m (5' 3.39\")   Wt 65.3 kg (144 lb)   SpO2 96%   BMI 25.20 kg/m       Physical Exam  General Appearance: she is well developed, well nourished; anxiously looking   Eyes:  conjutivae and extra-ocular motions are normal.                                    pupils round and reactive to light, no lid lag, no stare    HEENT:   oropharynx clear and moist, no JVD, no bruits      no thyromegaly, no palpable nodules   Cardiovascular:  regular rhythm, no murmurs, distal pulse palpable, no edema  Respiratory:        chest clear, no rales, no rhonchi   Musculoskeletal:  normal tone and strength  Psychological:          affect and judgment normal  Skin: no lesions on exposed skin  Neurological:  reflexes normal and symmetric, no resting tremor.     Lab Results  I reviewed prior lab results documented in careeverywhere.  TSH   Date Value Ref Range Status   03/20/2018 2.16 0.40 - 4.00 mU/L Final     T4 Free   Date Value Ref Range Status   03/20/2018 0.78 0.76 - 1.46 ng/dL Final     TSH   Date Value Ref Range Status   03/19/2019 2.50 0.40 - 4.00 mU/L Final   03/20/2018 2.16 0.40 - 4.00 mU/L Final   02/28/2017 1.73 0.40 - 4.00 mU/L Final   04/28/2016 1.91 0.40 - 4.00 mU/L Final   03/07/2016 0.89 0.40 - 4.00 mU/L Final       Assessment     Hashimoto thyroiditis    The patient attributes the fatigue to her thyroid hormone levels, although the fatigue has always been present when her thyroid hormone levels were normal, in the past.  She opted for treatment with [place] [facility], although I did not endorse " "this medication and I explained to her in the past there are no benefits to treatment with [place] Thyroid versus levothyroxine.      For the hair loss, I recommended to check ferritin, DHEAS and testosterone, in view of her history of celiac disease and acne.      She demands to have other lab work done, besides TSH and free T4.  She questions why I do not order \"hormone ratios\", progesterone.  I told her that I do not order tests at patient's request or tests I do not consider necessary.     Long-term, I encouraged the patient to follow-up with her primary care provider, if the thyroid hormone levels are stable.    Orders Placed This Encounter   Procedures     Ferritin     TSH     T4 free     Testosterone Free and Total     DHEA sulfate                      Again, thank you for allowing me to participate in the care of your patient.        Sincerely,        Gabby Ayala MD    "

## 2019-03-19 NOTE — PROGRESS NOTES
The patient is seen in f/up for evaluation of hypothyroidism.     Polly Hussein is a 44 year old female with a PMH significant for celiac disease, eczema, depression, diagnosed with hypothyroidism after the delivery of her 3rd child, over 8 years ago. She was initially treated with levothyroxine and Cytomel until 2011, when she established care with doctor Sameer and Cytomel was discontinued.  Average dose of levothyroxine had been around 100  g daily. Initially after diagnosis, she saw doctor Preethi.  In July 2014, she was seen by her primary care provider and she was transitioned to [facility] due to persistent fatigue. She continues to take 45 mg daily.     Polly has a long-standing history of fatigue, present since her first delivery, a few years prior to being formally diagnosed with hypothyroidism.  Besides feeling tired, today she also endorses a weight gain of 10 pounds over the last year, inability to lose weight, worsening hair loss and thinning of the eyebrows.    The patient was very irritated today by the fact that lab work was not done prior to the appointment.     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   Depression - stopped tx in 2013  Acne, treated with spironolactone  Allergic rhinitis  Dry eyes     Past Surgical History   Procedure Laterality Date     Ent surgery  1997     Tonsillectomy     Gyn surgery  2011     Partial Hysterectomy - prolapsed uterus and bladder sling - ovaries were left in place      Current Medications    Current Outpatient Medications:      clobetasol (TEMOVATE) 0.05 % cream, APPLY TO AFFECTED AREAS 2 TIMES DAILY, Disp: 30 g, Rfl: 0     loratadine (CLARITIN) 10 MG tablet, Take 10 mg by mouth daily, Disp: , Rfl:      mometasone (NASONEX) 50  MCG/ACT nasal spray, Spray 2 sprays into both nostrils daily, Disp: , Rfl:      SPIRONOLACTONE PO, Take 25 mg by mouth 2 times daily, Disp: , Rfl:      thyroid (ARMOUR THYROID) 15 MG TABS tablet, Take 3 tablets (45 mg) by mouth daily, Disp: 270 tablet, Rfl: 3     triamcinolone (KENALOG) 0.1 % cream, Apply topically 2 times daily, Disp: , Rfl:     Family History   Problem Relation Age of Onset     Heart  Father      Breast CA Maternal Grandmother      Diabetes  Paternal Grandmother      Colon CA, osteoporosis  Paternal Grandmother    Maternal grandmother - hypothyroidism.     Social History   with 3 children. She denies smoking, occasional drinks alcohol. Denies using illicit drugs. Occupation: .     Review of Systems   Systemic:             As above  Eye:                      R eye twitching for the last year    Go-Laryngeal:     No go-laryngeal symptoms, no dysphagia, no hoarseness, no cough     Breast:                  No breast symptoms  Cardiovascular:    No cardiovascular symptoms, no CP or palpitations   Pulmonary:           No pulmonary symptoms, no SOB or cough    Gastrointestinal:   Alternating episodes of diarrhea and constipation   Genitourinary:       No genitourinary symptoms, no increased thirst or urination   Endocrine:            cold intolerance which got worse over the years; denies hot flashes    Neurological:        No neurological symptoms, no headaches, no tremor, no numbness or tingling sensation, no dizziness   Musculoskeletal:  No musculoskeletal symptoms, no muscle or joint pain   Skin:                    Hair line is receeding for years; eyebrows are thinner and the hair loss has been worsening, recently   Psychological:     No psychological symptoms                 Vital Signs     Previous Weights:    Wt Readings from Last 10 Encounters:   03/19/19 65.3 kg (144 lb)   03/20/18 64.6 kg (142 lb 6.7 oz)   03/15/17 59.2 kg (130 lb 8.2 oz)   03/08/16 60.1 kg  "(132 lb 8 oz)   09/23/14 62.4 kg (137 lb 9 oz)        /76 (BP Location: Left arm, Patient Position: Chair, Cuff Size: Adult Regular)   Pulse 71   Ht 1.61 m (5' 3.39\")   Wt 65.3 kg (144 lb)   SpO2 96%   BMI 25.20 kg/m      Physical Exam  General Appearance: she is well developed, well nourished; anxiously looking   Eyes:  conjutivae and extra-ocular motions are normal.                                    pupils round and reactive to light, no lid lag, no stare    HEENT:   oropharynx clear and moist, no JVD, no bruits      no thyromegaly, no palpable nodules   Cardiovascular:  regular rhythm, no murmurs, distal pulse palpable, no edema  Respiratory:        chest clear, no rales, no rhonchi   Musculoskeletal:  normal tone and strength  Psychological:          affect and judgment normal  Skin: no lesions on exposed skin  Neurological:  reflexes normal and symmetric, no resting tremor.     Lab Results  I reviewed prior lab results documented in [facility].  TSH   Date Value Ref Range Status   03/20/2018 2.16 0.40 - 4.00 mU/L Final     T4 Free   Date Value Ref Range Status   03/20/2018 0.78 0.76 - 1.46 ng/dL Final     TSH   Date Value Ref Range Status   03/19/2019 2.50 0.40 - 4.00 mU/L Final   03/20/2018 2.16 0.40 - 4.00 mU/L Final   02/28/2017 1.73 0.40 - 4.00 mU/L Final   04/28/2016 1.91 0.40 - 4.00 mU/L Final   03/07/2016 0.89 0.40 - 4.00 mU/L Final       Assessment     Hashimoto thyroiditis    The patient attributes the fatigue to her thyroid hormone levels, although the fatigue has always been present when her thyroid hormone levels were normal, in the past.  She opted for treatment with [employer] [facility], although I did not endorse this medication and I explained to her in the past there are no benefits to treatment with [employer] [facility] versus levothyroxine.      For the hair loss, I recommended to check ferritin, DHEAS and testosterone, in view of her history of celiac disease and acne.      She demands " "to have other lab work done, besides TSH and free T4.  She questions why I do not order \"hormone ratios\", progesterone.  I told her that I do not order tests at patient's request or tests I do not consider necessary.     Long-term, I encouraged the patient to follow-up with her primary care provider, if the thyroid hormone levels are stable.    Orders Placed This Encounter   Procedures     Ferritin     TSH     T4 free     Testosterone Free and Total     DHEA sulfate                    "

## 2019-03-20 LAB — DHEA-S SERPL-MCNC: 45 UG/DL (ref 35–430)

## 2019-03-25 LAB
SHBG SERPL-SCNC: 53 NMOL/L (ref 30–135)
TESTOST FREE SERPL-MCNC: 0.08 NG/DL (ref 0.11–0.58)
TESTOST SERPL-MCNC: 7 NG/DL (ref 8–60)

## 2019-03-27 DIAGNOSIS — R79.89 LOW TESTOSTERONE: ICD-10-CM

## 2019-03-27 LAB
ESTRADIOL SERPL-MCNC: 72 PG/ML
FSH SERPL-ACNC: 8.9 IU/L
LH SERPL-ACNC: 6 IU/L

## 2019-03-27 PROCEDURE — 36415 COLL VENOUS BLD VENIPUNCTURE: CPT | Performed by: INTERNAL MEDICINE

## 2019-03-27 PROCEDURE — 83002 ASSAY OF GONADOTROPIN (LH): CPT | Performed by: INTERNAL MEDICINE

## 2019-03-27 PROCEDURE — 83001 ASSAY OF GONADOTROPIN (FSH): CPT | Performed by: INTERNAL MEDICINE

## 2019-03-27 PROCEDURE — 82670 ASSAY OF TOTAL ESTRADIOL: CPT | Performed by: INTERNAL MEDICINE

## 2019-03-27 RX ORDER — LIOTHYRONINE SODIUM 5 UG/1
2.5 TABLET ORAL 2 TIMES DAILY
Qty: 45 TABLET | Refills: 3 | Status: SHIPPED | OUTPATIENT
Start: 2019-03-27 | End: 2019-09-25

## 2019-03-27 RX ORDER — LEVOTHYROXINE SODIUM 50 UG/1
50 CAPSULE ORAL DAILY
Qty: 90 CAPSULE | Refills: 3 | Status: SHIPPED | OUTPATIENT
Start: 2019-03-27 | End: 2020-03-25

## 2019-07-19 DIAGNOSIS — E06.3 HASHIMOTO'S THYROIDITIS: ICD-10-CM

## 2019-07-19 DIAGNOSIS — L70.0 ACNE VULGARIS: Primary | ICD-10-CM

## 2019-07-19 DIAGNOSIS — L70.0 ACNE VULGARIS: ICD-10-CM

## 2019-07-19 LAB
POTASSIUM SERPL-SCNC: 4.4 MMOL/L (ref 3.4–5.3)
T3 SERPL-MCNC: 95 NG/DL (ref 60–181)
T4 FREE SERPL-MCNC: 0.86 NG/DL (ref 0.76–1.46)
TSH SERPL DL<=0.005 MIU/L-ACNC: 0.96 MU/L (ref 0.4–4)

## 2019-07-19 PROCEDURE — 84132 ASSAY OF SERUM POTASSIUM: CPT | Performed by: DERMATOLOGY

## 2019-07-19 PROCEDURE — 84443 ASSAY THYROID STIM HORMONE: CPT | Performed by: DERMATOLOGY

## 2019-07-19 PROCEDURE — 36415 COLL VENOUS BLD VENIPUNCTURE: CPT | Performed by: DERMATOLOGY

## 2019-07-19 PROCEDURE — 84439 ASSAY OF FREE THYROXINE: CPT | Performed by: DERMATOLOGY

## 2019-07-19 PROCEDURE — 84480 ASSAY TRIIODOTHYRONINE (T3): CPT | Performed by: DERMATOLOGY

## 2019-07-22 ENCOUNTER — TELEPHONE (OUTPATIENT)
Dept: ENDOCRINOLOGY | Facility: CLINIC | Age: 44
End: 2019-07-22

## 2019-07-22 NOTE — TELEPHONE ENCOUNTER
Component      Latest Ref Rng & Units 7/19/2019   Triiodothyronine (T3)      60 - 181 ng/dL 95   T4 Free      0.76 - 1.46 ng/dL 0.86   TSH      0.40 - 4.00 mU/L 0.96       Patient advised. Patient verbalizes understanding and agrees to plan.     Brandy Chakraborty, RN  Endocrine Care Coordinator  [facility]

## 2019-07-22 NOTE — TELEPHONE ENCOUNTER
----- Message from Gabby Ayala MD sent at 7/21/2019  6:01 PM CDT -----  All the thyroid hormone levels are in the normal range.  She may continue to take the same dose of Tirosint and Cytomel.

## 2020-03-10 DIAGNOSIS — E06.3 HASHIMOTO'S THYROIDITIS: ICD-10-CM

## 2020-03-16 NOTE — TELEPHONE ENCOUNTER
clobetasol (TEMOVATE) 0.05 % cream        Last Written Prescription Date:  08/27/2018  Last Fill Quantity: 30g ,   # refills: 0  Last Office Visit : 03/19/2019  Future Office visit:  none    Routing refill request to provider for review/approval because: not on protocol.

## 2020-03-17 RX ORDER — CLOBETASOL PROPIONATE 0.5 MG/G
CREAM TOPICAL
Qty: 30 G | Refills: 0 | Status: SHIPPED | OUTPATIENT
Start: 2020-03-17

## 2020-03-25 DIAGNOSIS — E06.3 HASHIMOTO'S THYROIDITIS: ICD-10-CM

## 2020-03-25 RX ORDER — LEVOTHYROXINE SODIUM 50 UG/1
CAPSULE ORAL
Qty: 90 CAPSULE | Refills: 0 | Status: SHIPPED | OUTPATIENT
Start: 2020-03-25 | End: 2020-06-12

## 2020-03-25 NOTE — TELEPHONE ENCOUNTER
"   TIROSINT 50 MCG CAPSULE    Last Written Prescription Date:  3/27/2019  Last Fill Quantity: 90,   # refills: 3  Last Office Visit : 3/19/2019  Future Office visit:  None     90 day refill request sent: called for RX, appt due when safe, labs reviewed 7/21/2019.\"All the thyroid hormone levels are in the normal range.  She may continue to take the same dose of Tirosint and Cytomel\"          "

## 2020-06-09 DIAGNOSIS — E06.3 HASHIMOTO'S THYROIDITIS: ICD-10-CM

## 2020-06-12 ENCOUNTER — TELEPHONE (OUTPATIENT)
Dept: ENDOCRINOLOGY | Facility: CLINIC | Age: 45
End: 2020-06-12

## 2020-06-12 RX ORDER — LEVOTHYROXINE SODIUM 50 UG/1
50 CAPSULE ORAL DAILY
Qty: 30 CAPSULE | Refills: 0 | Status: SHIPPED | OUTPATIENT
Start: 2020-06-12

## 2020-06-12 NOTE — TELEPHONE ENCOUNTER
TIROSINT 50 MCG capsule  TAKE 1 TABLET BY MOUTH ONCE DAILY       Last Written Prescription Date:  3/25/20  Last Fill Quantity: 90,   # refills: 0  Last Office Visit : 3/19/19   Return in about 1 year (around 3/19/2020) for labs today.     Future Office visit:  none  30 day shruthi to pharmacy.    Routing  because:  Overdue office visit     Scheduling has been notified to contact the pt for appointment.